# Patient Record
Sex: MALE | Race: WHITE | URBAN - METROPOLITAN AREA
[De-identification: names, ages, dates, MRNs, and addresses within clinical notes are randomized per-mention and may not be internally consistent; named-entity substitution may affect disease eponyms.]

---

## 2019-01-12 ENCOUNTER — OFFICE VISIT (OUTPATIENT)
Dept: URGENT CARE | Facility: URGENT CARE | Age: 24
End: 2019-01-12
Payer: COMMERCIAL

## 2019-01-12 VITALS
WEIGHT: 135 LBS | OXYGEN SATURATION: 98 % | TEMPERATURE: 98.1 F | RESPIRATION RATE: 12 BRPM | DIASTOLIC BLOOD PRESSURE: 72 MMHG | SYSTOLIC BLOOD PRESSURE: 120 MMHG | HEART RATE: 88 BPM

## 2019-01-12 DIAGNOSIS — J02.9 ACUTE PHARYNGITIS, UNSPECIFIED ETIOLOGY: Primary | ICD-10-CM

## 2019-01-12 LAB
DEPRECATED S PYO AG THROAT QL EIA: NORMAL
SPECIMEN SOURCE: NORMAL

## 2019-01-12 PROCEDURE — 87081 CULTURE SCREEN ONLY: CPT | Performed by: PHYSICIAN ASSISTANT

## 2019-01-12 PROCEDURE — 87880 STREP A ASSAY W/OPTIC: CPT | Performed by: PHYSICIAN ASSISTANT

## 2019-01-12 PROCEDURE — 99203 OFFICE O/P NEW LOW 30 MIN: CPT | Performed by: PHYSICIAN ASSISTANT

## 2019-01-12 NOTE — NURSING NOTE
Chief Complaint   Patient presents with     Urgent Care     Sick     Pt has not felt well since Thursday.  He has no energy, sleeping alot, stuffy nose, sore throat and h/a.  Also needs a note for work.       Initial /72   Pulse 88   Temp 98.1  F (36.7  C) (Oral)   Resp 12   Wt 61.2 kg (135 lb)   SpO2 98%  There is no height or weight on file to calculate BMI..  BP completed using cuff size: regular  Susy Fatima R.N.

## 2019-01-12 NOTE — PROGRESS NOTES
SUBJECTIVE:   Thien Paredes is a 23 year old male presenting with a chief complaint of ST, HA, decreased energy stuffy nose.  had fevers over the past few days, tactile temp and sweating with chills.  No  ear pain.  Does have mild cough.  No SOB or chest pain .    Did not have flu shot.    Onset of symptoms was 3 day(s) ago.  Course of illness is same.    Severity mild  Current and Associated symptoms: no GI sx, rashes or other associated sx  Treatment measures tried include Tylenol/Ibuprofen, Fluids and Rest.  Cough and cold medications  Predisposing factors include ill contact: none known.    PMH:  No underlying medical issues.      MED:  Takes no daily med    No past medical history on file.  No current outpatient medications on file.     Social History     Tobacco Use     Smoking status: Never Smoker     Smokeless tobacco: Never Used   Substance Use Topics     Alcohol use: Not on file       ROS:  Review of systems negative except as stated above.    OBJECTIVE:  /72   Pulse 88   Temp 98.1  F (36.7  C) (Oral)   Resp 12   Wt 61.2 kg (135 lb)   SpO2 98%   GENERAL APPEARANCE: healthy, alert and no distress  EYES: EOMI,  PERRL, conjunctiva clear  HENT: ear canals and TM's normal.  Nose and mouth without ulcers, erythema or lesions.  Uvula midline and no abscess noted . No exudate  NECK: supple, nontender, no lymphadenopathy  RESP: lungs clear to auscultation - no rales, rhonchi or wheezes  CV: regular rates and rhythm, normal S1 S2, no murmur noted  NEURO: Normal strength and tone, sensory exam grossly normal,  normal speech and mentation  SKIN: no suspicious lesions or rashes    Results for orders placed or performed in visit on 01/12/19   Strep, Rapid Screen   Result Value Ref Range    Specimen Description Throat     Rapid Strep A Screen       NEGATIVE: No Group A streptococcal antigen detected by immunoassay, await culture report.       assessment/plan:  (J02.9) Acute pharyngitis, unspecified  etiology  (primary encounter diagnosis)  Comment:   Plan: Strep, Rapid Screen, Beta strep group A culture        Patient appears well and no signs of bacterial infection. OTC med for sx relief and culture pending for strep.  May have flu and nature of it discussed . Exam reassuring.  Red flag signs discussed . Follow-up with PCP as needed

## 2019-01-13 LAB
BACTERIA SPEC CULT: NORMAL
SPECIMEN SOURCE: NORMAL